# Patient Record
Sex: MALE | Race: WHITE | Employment: FULL TIME | URBAN - METROPOLITAN AREA
[De-identification: names, ages, dates, MRNs, and addresses within clinical notes are randomized per-mention and may not be internally consistent; named-entity substitution may affect disease eponyms.]

---

## 2024-04-09 ENCOUNTER — OFFICE VISIT (OUTPATIENT)
Dept: ORTHOPEDIC SURGERY | Age: 39
End: 2024-04-09
Payer: COMMERCIAL

## 2024-04-09 DIAGNOSIS — M17.0 PRIMARY OSTEOARTHRITIS OF BOTH KNEES: ICD-10-CM

## 2024-04-09 DIAGNOSIS — M25.561 PAIN IN BOTH KNEES, UNSPECIFIED CHRONICITY: Primary | ICD-10-CM

## 2024-04-09 DIAGNOSIS — M25.562 PAIN IN BOTH KNEES, UNSPECIFIED CHRONICITY: Primary | ICD-10-CM

## 2024-04-09 PROCEDURE — 99203 OFFICE O/P NEW LOW 30 MIN: CPT | Performed by: STUDENT IN AN ORGANIZED HEALTH CARE EDUCATION/TRAINING PROGRAM

## 2024-04-09 NOTE — PROGRESS NOTES
Strength: Normal quadriceps development.     Effusion: No effusion or swelling present.     Ligamentous stability: No cruciate or collateral ligament instability.     Neurologic and vascular: Skin is warm and well-perfused. Sensation is intact to light-touch.     Special tests: Negative Jazmyne sign.       Right knee comparison exam    Gait: No use of assistive devices. No antalgic gait.    Alignment: varus alignment noted passively correctable to neutral    Inspection/skin: Skin is intact without erythema or ecchymosis. No gross deformity.  Surgical scar noted over the anterior medial aspect of the knee    Palpation: mild crepitus.  Tender over the medial joint line.    Range of Motion: There is full range of motion.     Strength: Normal quadriceps development.     Effusion: No effusion or swelling present.     Ligamentous stability: No cruciate or collateral ligament instability.     Neurologic and vascular: Skin is warm and well-perfused. Sensation is intact to light-touch.     Special tests: Negative Jazmyne sign.         Diagnostics:  Radiology:       4 views of the bilateral knees taken in the office today demonstrate severe tricompartmental osteoarthritis to both knees with varus alignment.  Complete loss of medial joint space to both knees.  Large osteophyte formation subchondral cyst formation and subchondral sclerosis noted.  The right knee demonstrates a retained headless compression screw in the medial femoral condyle      Assessment: 39 y.o. male with bilateral knee osteoarthritis, severe    Plan: Pertinent imaging was reviewed. The etiology, natural history, and treatment options for the disorder were discussed.  The roles of activity medication, antiinflammatories, injections, bracing, physical therapy, and surgical interventions were all described to the patient and questions were answered.    I had a very long discussion with the patient today.  Unfortunately the patient has a difficult